# Patient Record
Sex: MALE | Race: WHITE | Employment: OTHER | URBAN - METROPOLITAN AREA
[De-identification: names, ages, dates, MRNs, and addresses within clinical notes are randomized per-mention and may not be internally consistent; named-entity substitution may affect disease eponyms.]

---

## 2023-02-03 ENCOUNTER — OFFICE VISIT (OUTPATIENT)
Dept: ORTHOPEDIC SURGERY | Age: 78
End: 2023-02-03

## 2023-02-03 DIAGNOSIS — M17.11 OSTEOARTHRITIS OF RIGHT KNEE, UNSPECIFIED OSTEOARTHRITIS TYPE: Primary | ICD-10-CM
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
## 2024-07-10 ENCOUNTER — TELEPHONE (OUTPATIENT)
Dept: ORTHOPEDIC SURGERY | Age: 79
End: 2024-07-10

## 2024-07-10 NOTE — TELEPHONE ENCOUNTER
Tried to call patient to make sure he just needs a new order. No answer- phone kept ringing so unable to leave VM

## 2025-01-06 ENCOUNTER — OFFICE VISIT (OUTPATIENT)
Dept: ORTHOPEDIC SURGERY | Age: 80
End: 2025-01-06
Payer: MEDICARE

## 2025-01-06 VITALS — BODY MASS INDEX: 28 KG/M2 | WEIGHT: 200 LBS | HEIGHT: 71 IN

## 2025-01-06 DIAGNOSIS — M17.11 OSTEOARTHRITIS OF RIGHT KNEE, UNSPECIFIED OSTEOARTHRITIS TYPE: Primary | ICD-10-CM

## 2025-01-06 PROCEDURE — 1123F ACP DISCUSS/DSCN MKR DOCD: CPT | Performed by: ORTHOPAEDIC SURGERY

## 2025-01-06 PROCEDURE — 99204 OFFICE O/P NEW MOD 45 MIN: CPT | Performed by: ORTHOPAEDIC SURGERY

## 2025-01-06 PROCEDURE — 20611 DRAIN/INJ JOINT/BURSA W/US: CPT | Performed by: ORTHOPAEDIC SURGERY

## 2025-01-06 RX ORDER — TRIAMCINOLONE ACETONIDE 40 MG/ML
40 INJECTION, SUSPENSION INTRA-ARTICULAR; INTRAMUSCULAR ONCE
Status: COMPLETED | OUTPATIENT
Start: 2025-01-06 | End: 2025-01-06

## 2025-01-06 RX ORDER — TRIAMCINOLONE ACETONIDE 40 MG/ML
40 INJECTION, SUSPENSION INTRA-ARTICULAR; INTRAMUSCULAR ONCE
Status: CANCELLED | OUTPATIENT
Start: 2025-01-06 | End: 2025-01-06

## 2025-01-06 RX ADMIN — TRIAMCINOLONE ACETONIDE 40 MG: 40 INJECTION, SUSPENSION INTRA-ARTICULAR; INTRAMUSCULAR at 08:58

## 2025-01-06 NOTE — PROGRESS NOTES
and they are in no distress.  The lower extremities are as described below.  Circulation is normal with palpable pedal pulses bilaterally and no edema.  There is no lymph adenopathy in the popliteal or malleolar region.  The skin is without stasis disease distally bilaterally.  Hip ROM is smooth and painless.  Knee range of motion is 0-120 degrees on the right and 0-120 degrees on the left.  right knee: There is 2mm of anterior/posterior translation and 2mm of medial/lateral instability bilaterally.  There is 5 degrees of varus alignment in the right knee.  There is some pain to palpation over the medial joint line.  Limb lengths are equal.  The patient ambulates with fairly normal reciprocal gait.  Right knee varus thrust noted during stance phase.  Straight leg test is negative.  Quadriceps strength is good.  Sensation is intact to light touch bilaterally.  Their judgment and insight are normal.  They are oriented to time, place and person.  Their memory is good and the mood and affect appropriate.    X-RAY: Views of the right knee are reviewed.  4 views standing reveal joint space loss, eburnated bone, and osteophyte formation present.      X-ray impression:  Advanced degenerative joint disease of the right knee.    IMPRESSION:    Diagnosis Orders   1. Osteoarthritis of right knee, unspecified osteoarthritis type  XR KNEE RIGHT (MIN 4 VIEWS)    triamcinolone acetonide (KENALOG-40) injection 40 mg    US ARTHR/ASP/INJ MAJOR JNT/BURSA RIGHT      .    RECOMMENDATIONS:    Reviewed x-ray findings with the patient. The concerns with functional limitations are increasing and response is variable with conservative measures. Surgery was discussed today with the patient.  They are not limited to warrant any type of surgical consideration.  We will additionally try injection therapies as we process management of this progressive and chronic condition.    TKA - Today we also discussed knee replacement surgery, and implant